# Patient Record
Sex: MALE | Race: WHITE
[De-identification: names, ages, dates, MRNs, and addresses within clinical notes are randomized per-mention and may not be internally consistent; named-entity substitution may affect disease eponyms.]

---

## 2017-04-16 ENCOUNTER — HOSPITAL ENCOUNTER (EMERGENCY)
Dept: HOSPITAL 75 - ER | Age: 27
Discharge: HOME | End: 2017-04-16
Payer: MEDICAID

## 2017-04-16 VITALS — DIASTOLIC BLOOD PRESSURE: 92 MMHG | SYSTOLIC BLOOD PRESSURE: 124 MMHG

## 2017-04-16 VITALS — WEIGHT: 180 LBS | BODY MASS INDEX: 26.66 KG/M2 | HEIGHT: 69 IN

## 2017-04-16 DIAGNOSIS — F31.9: ICD-10-CM

## 2017-04-16 DIAGNOSIS — G40.909: ICD-10-CM

## 2017-04-16 DIAGNOSIS — F17.210: ICD-10-CM

## 2017-04-16 DIAGNOSIS — F41.9: Primary | ICD-10-CM

## 2017-04-16 LAB
ALBUMIN SERPL-MCNC: 4.3 G/DL (ref 3.2–4.5)
ALT SERPL-CCNC: 93 U/L (ref 0–55)
ANION GAP SERPL CALC-SCNC: 10 MMOL/L (ref 5–14)
APAP SERPL-MCNC: < 10 UG/ML (ref 10–30)
AST SERPL-CCNC: 45 U/L (ref 5–34)
BASOPHILS # BLD AUTO: 0.1 10^3/UL (ref 0–0.1)
BASOPHILS NFR BLD AUTO: 1 % (ref 0–10)
BILIRUB SERPL-MCNC: 0.7 MG/DL (ref 0.1–1)
BILIRUB UR QL STRIP: NEGATIVE
BUN SERPL-MCNC: 14 MG/DL (ref 7–18)
BUN/CREAT SERPL: 19
CALCIUM SERPL-MCNC: 9.2 MG/DL (ref 8.5–10.1)
CHLORIDE SERPL-SCNC: 106 MMOL/L (ref 98–107)
CO2 SERPL-SCNC: 24 MMOL/L (ref 21–32)
CREAT SERPL-MCNC: 0.72 MG/DL (ref 0.6–1.3)
EOSINOPHIL # BLD AUTO: 0.2 10^3/UL (ref 0–0.3)
EOSINOPHIL NFR BLD AUTO: 3 % (ref 0–10)
ERYTHROCYTE [DISTWIDTH] IN BLOOD BY AUTOMATED COUNT: 12.8 % (ref 10–14.5)
ETHANOL SERPL-MCNC: < 10 MG/DL (ref ?–10)
GFR SERPLBLD BASED ON 1.73 SQ M-ARVRAT: > 60 ML/MIN
GLUCOSE SERPL-MCNC: 95 MG/DL (ref 70–105)
KETONES UR QL STRIP: NEGATIVE
LEUKOCYTE ESTERASE UR QL STRIP: (no result)
LYMPHOCYTES # BLD AUTO: 2.3 X 10^3 (ref 1–4)
LYMPHOCYTES NFR BLD AUTO: 32 % (ref 12–44)
MCH RBC QN AUTO: 31 PG (ref 25–34)
MCHC RBC AUTO-ENTMCNC: 34 G/DL (ref 32–36)
MCV RBC AUTO: 89 FL (ref 80–99)
MONOCYTES # BLD AUTO: 0.6 X 10^3 (ref 0–1)
MONOCYTES NFR BLD AUTO: 8 % (ref 0–12)
NEUTROPHILS # BLD AUTO: 4.1 X 10^3 (ref 1.8–7.8)
NEUTROPHILS NFR BLD AUTO: 57 % (ref 42–75)
NITRITE UR QL STRIP: NEGATIVE
PH UR STRIP: 7 [PH] (ref 5–9)
PLATELET # BLD: 284 10^3/UL (ref 130–400)
PMV BLD AUTO: 8.4 FL (ref 7.4–10.4)
POTASSIUM SERPL-SCNC: 3.9 MMOL/L (ref 3.6–5)
PROT SERPL-MCNC: 6.9 G/DL (ref 6.4–8.2)
PROT UR QL STRIP: NEGATIVE
RBC # BLD AUTO: 4.56 10^6/UL (ref 4.35–5.85)
SALICYLATES SERPL-MCNC: < 5 MG/DL (ref 5–20)
SODIUM SERPL-SCNC: 140 MMOL/L (ref 135–145)
SP GR UR STRIP: 1.01 (ref 1.02–1.02)
SQUAMOUS #/AREA URNS HPF: (no result) /HPF
UROBILINOGEN UR-MCNC: 1 MG/DL
WBC # BLD AUTO: 7.2 10^3/UL (ref 4.3–11)
WBC #/AREA URNS HPF: (no result) /HPF

## 2017-04-16 PROCEDURE — 85025 COMPLETE CBC W/AUTO DIFF WBC: CPT

## 2017-04-16 PROCEDURE — 80074 ACUTE HEPATITIS PANEL: CPT

## 2017-04-16 PROCEDURE — 80306 DRUG TEST PRSMV INSTRMNT: CPT

## 2017-04-16 PROCEDURE — 80329 ANALGESICS NON-OPIOID 1 OR 2: CPT

## 2017-04-16 PROCEDURE — 93005 ELECTROCARDIOGRAM TRACING: CPT

## 2017-04-16 PROCEDURE — 36415 COLL VENOUS BLD VENIPUNCTURE: CPT

## 2017-04-16 PROCEDURE — 80320 DRUG SCREEN QUANTALCOHOLS: CPT

## 2017-04-16 PROCEDURE — 81000 URINALYSIS NONAUTO W/SCOPE: CPT

## 2017-04-16 PROCEDURE — 84443 ASSAY THYROID STIM HORMONE: CPT

## 2017-04-16 PROCEDURE — 80053 COMPREHEN METABOLIC PANEL: CPT

## 2017-04-16 NOTE — ED PSYCHOSOCIAL
General


Chief Complaint:  Psych/Social Disorder


Stated Complaint:  AMS


Source:  patient (SOMEWHAT DIFFICULT HISTORIAN)





History of Present Illness


Time seen by provider:  12:07


Initial Comments


PT STATES "I'M BIPOLAR AND IT'S GETTING THE BEST OF ME"


PT STATES IS MOSTLY ANXIETY


PT STATES HE HAS NOT TAKEN ANY MEDICATIONS OR SEEN A DR IN 4-5 YEARS, AND 

SYMPTOMS HAVE BEEN WORSE FOR THE PAST 3-4 YEARS


HAS NOT SOUGHT CARE AT ANY TIME SINCE HE STOPPED TAKING HIS MEDICATIONS


STATES HE USED TO SEE DR. PATEL FOR MEDICAL PROBLEMS, AND SAW DR. IN 

[place] FOR MENTAL HEALTH





Allergies and Home Medications


Allergies


Coded Allergies:  


     methadone (Unverified  Allergy, Mild, 12/20/09)


     nortriptyline (Unverified  Allergy, Mild, 12/20/09)


     divalproex sodium (Verified  Allergy, Unknown, 10/11/15)





Home Medications


Cyclobenzaprine HCl 10 Mg Tablet, 10 MG PO Q8H PRN for SPASMS, #10 Ref 0


   Prescribed by: JACINTO HINES on 10/11/15 2215


Hydroxyzine Pamoate 50 Mg Capsule, 50 MG PO Q6H, #20


   Prescribed by: QUINTIN LOREDO on 4/16/17 1400


Tramadol HCl 50 Mg Tablet, 50 MG PO Q4H PRN for PAIN, #14 Ref 0


   Prescribed by: JACINTO HINES on 10/11/15 2213





Constitutional:  no symptoms reported


Respiratory:  no symptoms reported


Cardiovascular:  no symptoms reported


Gastrointestinal:  no symptoms reported


Genitourinary:  no symptoms reported


Musculoskeletal:  no symptoms reported


Skin:  no symptoms reported


Psychiatric/Neurological:  See HPI, Anxiety





Past Medical-Social-Family Hx


Patient Social History


Alcohol Use:  Regular Use (HISTORY OF VERY HEAVY USE/ABUSE--NOW DRINKS "A FEW 

BEERS A COUPLE OF TIMES A MONTH" STATES LAST ETOH WAS 1 WEEK AGO, PER PT ON 04/ 16/17)


Recreational Drug Use:  Yes (THC NOW, "EVERYTHING" IN THE PAST DENIES IV USE)


Smoking Status:  Current Everyday Smoker (1 1/2 PPD)


Recent Foreign Travel:  No


Contact w/Someone Who Travel:  No





Immunizations Up To Date


Tetanus Booster (TDap):  Unknown





Surgeries


HX Surgeries:  Yes (EYE SURGERY X 2--ONCE AS CHILD, ONCE AS ADULT)


Surgeries:  Eye Surgery





Respiratory


Hx Respiratory Disorders:  No





Cardiovascular


Hx Cardiac Disorders:  No





Neurological


Hx Neurological Disorders:  Yes (seizures (none for 10 yrs))


Neurological Disorders:  Seizure Disorder





Reproductive System


Hx Reproductive Disorders:  No


Sexually Transmitted Disease:  No





Genitourinary


Hx Genitourinary Disorders:  No





Gastrointestinal


Hx Gastrointestinal Disorders:  No





Musculoskeletal


Hx Musculoskeletal Disorders:  No





Endocrine


Hx Endocrine Disorders:  No





HEENT


HX ENT Disorders:  No





Cancer


Hx Cancer:  No





Psychosocial


Hx Psychiatric Problems:  Yes


Behavioral Health Disorders:  ADD/ADHD, Anxiety, Bipolar, Depression





Integumentary


HX Skin/Integumentary Disorder:  No





Blood Transfusions


Hx Blood Disorders:  No


Adverse Reaction to a Blood Tr:  No





Family Medical History


Significant Family History:  No Pertinent Family Hx





Physical Exam


Vital Signs





Vital Sign - Last 12Hours








 4/16/17





 12:15


 


Temp 96.0


 


Pulse 80


 


Resp 18


 


B/P (MAP) 127/96


 


Pulse Ox 99


 


O2 Delivery Room Air





Capillary Refill :


General Appearance:  WD/WN, no apparent distress, other (DIRTY, UNKMEPT)


HEENT:  PERRL/EOMI


Neck:  normal inspection


Respiratory:  normal breath sounds, no respiratory distress, no accessory 

muscle use


Cardiovascular:  regular rate, rhythm, no murmur


Gastrointestinal:  normal bowel sounds, non tender, soft


Extremities:  normal inspection


Neurologic/Psychiatric:  CNs II-XII nml as tested, no motor/sensory deficits, 

alert, oriented x 3, other (SOMEWHAT ANXIOUS)


Appearance/Memory:  no memory impairment, disheveled


Behavior/Eye Contact:  cooperative, normal speech, other (NO SUICIDAL OR 

HOMICIDAL IDEATIONS)


Thoughts/Hallucinations:  no apparent hallucination


Skin:  normal color, warm/dry, tattoos/piercings (MULTIPLE TATTOOS)





Progress/Results/Core Measures


Results/Orders


Lab Results





Laboratory Tests








Test


  4/16/17


12:30 4/16/17


12:40 Range/Units


 


 


Urine Color YELLOW    


 


Urine Clarity CLEAR    


 


Urine pH 7   5-9  


 


Urine Specific Gravity 1.010 L  1.016-1.022  


 


Urine Protein NEGATIVE   NEGATIVE  


 


Urine Glucose (UA) NEGATIVE   NEGATIVE  


 


Urine Ketones NEGATIVE   NEGATIVE  


 


Urine Nitrite NEGATIVE   NEGATIVE  


 


Urine Bilirubin NEGATIVE   NEGATIVE  


 


Urine Urobilinogen 1   NORMAL  MG/DL


 


Urine Leukocyte Esterase 1+ H  NEGATIVE  


 


Urine RBC (Auto) 1+ H  NEGATIVE  


 


Urine RBC RARE    /HPF


 


Urine WBC 2-5    /HPF


 


Urine Squamous Epithelial


Cells RARE 


  


   /HPF


 


 


Urine Crystals NONE    /LPF


 


Urine Bacteria TRACE    /HPF


 


Urine Casts NONE    /LPF


 


Urine Mucus SMALL H   /LPF


 


Urine Culture Indicated NO    


 


Urine Opiates Screen NEGATIVE   NEGATIVE  


 


Urine Oxycodone Screen NEGATIVE   NEGATIVE  


 


Urine Methadone Screen NEGATIVE   NEGATIVE  


 


Urine Propoxyphene Screen NEGATIVE   NEGATIVE  


 


Urine Barbiturates Screen NEGATIVE   NEGATIVE  


 


Ur Tricyclic Antidepressants


Screen NEGATIVE 


  


  NEGATIVE  


 


 


Urine Phencyclidine Screen NEGATIVE   NEGATIVE  


 


Urine Amphetamines Screen NEGATIVE   NEGATIVE  


 


Urine Methamphetamines Screen NEGATIVE   NEGATIVE  


 


Urine Benzodiazepines Screen NEGATIVE   NEGATIVE  


 


Urine Cocaine Screen NEGATIVE   NEGATIVE  


 


Urine Cannabinoids Screen POSITIVE H  NEGATIVE  


 


White Blood Count


  


  7.2 


  4.3-11.0


10^3/uL


 


Red Blood Count


  


  4.56 


  4.35-5.85


10^6/uL


 


Hemoglobin  13.9  13.3-17.7  G/DL


 


Hematocrit  40  40-54  %


 


Mean Corpuscular Volume  89  80-99  FL


 


Mean Corpuscular Hemoglobin  31  25-34  PG


 


Mean Corpuscular Hemoglobin


Concent 


  34 


  32-36  G/DL


 


 


Red Cell Distribution Width  12.8  10.0-14.5  %


 


Platelet Count


  


  284 


  130-400


10^3/uL


 


Mean Platelet Volume  8.4  7.4-10.4  FL


 


Neutrophils (%) (Auto)  57  42-75  %


 


Lymphocytes (%) (Auto)  32  12-44  %


 


Monocytes (%) (Auto)  8  0-12  %


 


Eosinophils (%) (Auto)  3  0-10  %


 


Basophils (%) (Auto)  1  0-10  %


 


Neutrophils # (Auto)  4.1  1.8-7.8  X 10^3


 


Lymphocytes # (Auto)  2.3  1.0-4.0  X 10^3


 


Monocytes # (Auto)  0.6  0.0-1.0  X 10^3


 


Eosinophils # (Auto)


  


  0.2 


  0.0-0.3


10^3/uL


 


Basophils # (Auto)


  


  0.1 


  0.0-0.1


10^3/uL


 


Sodium Level  140  135-145  MMOL/L


 


Potassium Level  3.9  3.6-5.0  MMOL/L


 


Chloride Level  106    MMOL/L


 


Carbon Dioxide Level  24  21-32  MMOL/L


 


Anion Gap  10  5-14  MMOL/L


 


Blood Urea Nitrogen  14  7-18  MG/DL


 


Creatinine


  


  0.72 


  0.60-1.30


MG/DL


 


Estimat Glomerular Filtration


Rate 


  > 60 


   


 


 


BUN/Creatinine Ratio  19   


 


Glucose Level  95    MG/DL


 


Calcium Level  9.2  8.5-10.1  MG/DL


 


Total Bilirubin  0.7  0.1-1.0  MG/DL


 


Aspartate Amino Transf


(AST/SGOT) 


  45 H


  5-34  U/L


 


 


Alanine Aminotransferase


(ALT/SGPT) 


  93 H


  0-55  U/L


 


 


Alkaline Phosphatase  67    U/L


 


Total Protein  6.9  6.4-8.2  G/DL


 


Albumin  4.3  3.2-4.5  G/DL


 


TSH [place] Testing


  


  1.05 


  0.35-4.94


UIU/ML


 


Salicylates Level  < 5.0 L 5.0-20.0  MG/DL


 


Acetaminophen Level  < 10 L 10-30  UG/ML


 


Serum Alcohol  < 10  <10  MG/DL








My Orders





Orders - QUINTIN LOREDO DO


Ua Culture If Indicated (4/16/17 12:10)


Thyroid Analyzer (4/16/17 12:10)


Drug Screen Stat (Urine) (4/16/17 12:10)


Cbc With Automated Diff (4/16/17 12:10)


Comprehensive Metabolic Panel (4/16/17 12:10)


Alcohol (4/16/17 12:10)


Acetaminophen (4/16/17 12:10)


Salicylate (4/16/17 12:10)


Ekg Tracing (4/16/17 12:10)


Hepatitis Panel Acute (4/16/17 13:48)





Vital Signs/I&O





Vital Sign - Last 12Hours








 4/16/17 4/16/17





 12:15 14:05


 


Temp 96.0 


 


Pulse 80 78


 


Resp 18 18


 


B/P (MAP) 127/96 


 


Pulse Ox 99 99


 


O2 Delivery Room Air 











Departure


Impression


Impression:  


 Primary Impression:  


 Anxiety


 Additional Impression:  


 Hx of bipolar disorder


Disposition:  01 HOME, SELF-CARE


Condition:  Stable





Departure-Patient Inst.


Referrals:  


NO,LOCAL PHYSICIAN (PCP)


Primary Care Physician








[facility]


Patient Instructions:  Anxiety, Adult (DC), Bipolar Disorder (DC)





Add. Discharge Instructions:  


CALL [facility] TOMORROW TO ESTABLISH CARE WITH BOTH MEDICAL AND MENTAL HEALTH





All discharge instructions reviewed with patient and/or family. Voiced 

understanding.


Scripts


Hydroxyzine Pamoate (Vistaril) 50 Mg Capsule


50 MG PO Q6H for Anxiety, #20 CAP


   Prov: QUINTIN LOREDO DO         4/16/17











QUINTIN LOREDO DO Apr 16, 2017 12:17

## 2017-04-17 LAB — HCV INDEX: >11 (ref 0–0.79)

## 2019-04-26 ENCOUNTER — HOSPITAL ENCOUNTER (EMERGENCY)
Dept: HOSPITAL 75 - ER | Age: 29
Discharge: HOME | End: 2019-04-26
Payer: COMMERCIAL

## 2019-04-26 VITALS — BODY MASS INDEX: 25.18 KG/M2 | WEIGHT: 170 LBS | HEIGHT: 69 IN

## 2019-04-26 VITALS — DIASTOLIC BLOOD PRESSURE: 79 MMHG | SYSTOLIC BLOOD PRESSURE: 129 MMHG

## 2019-04-26 DIAGNOSIS — R40.2252: ICD-10-CM

## 2019-04-26 DIAGNOSIS — F31.9: ICD-10-CM

## 2019-04-26 DIAGNOSIS — S42.294A: Primary | ICD-10-CM
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
## 2019-04-26 DIAGNOSIS — R40.2362: ICD-10-CM

## 2019-04-26 DIAGNOSIS — M46.96: ICD-10-CM

## 2019-04-26 DIAGNOSIS — G40.909: ICD-10-CM

## 2019-04-26 DIAGNOSIS — Z88.5: ICD-10-CM

## 2019-04-26 DIAGNOSIS — R40.2142: ICD-10-CM

## 2019-04-26 DIAGNOSIS — Z88.8: ICD-10-CM

## 2019-04-26 LAB
ALBUMIN SERPL-MCNC: 4.4 GM/DL (ref 3.2–4.5)
ALP SERPL-CCNC: 72 U/L (ref 40–136)
ALT SERPL-CCNC: 56 U/L (ref 0–55)
APTT PPP: YELLOW S
BACTERIA #/AREA URNS HPF: NEGATIVE /HPF
BARBITURATES UR QL: NEGATIVE
BASOPHILS # BLD AUTO: 0 10^3/UL (ref 0–0.1)
BASOPHILS NFR BLD AUTO: 0 % (ref 0–10)
BENZODIAZ UR QL SCN: NEGATIVE
BILIRUB SERPL-MCNC: 0.4 MG/DL (ref 0.1–1)
BILIRUB UR QL STRIP: NEGATIVE
BUN/CREAT SERPL: 22
CALCIUM SERPL-MCNC: 9.6 MG/DL (ref 8.5–10.1)
CHLORIDE SERPL-SCNC: 107 MMOL/L (ref 98–107)
CO2 SERPL-SCNC: 18 MMOL/L (ref 21–32)
COCAINE UR QL: NEGATIVE
CREAT SERPL-MCNC: 0.85 MG/DL (ref 0.6–1.3)
EOSINOPHIL # BLD AUTO: 0.2 10^3/UL (ref 0–0.3)
EOSINOPHIL NFR BLD AUTO: 2 % (ref 0–10)
ERYTHROCYTE [DISTWIDTH] IN BLOOD BY AUTOMATED COUNT: 12.5 % (ref 10–14.5)
FIBRINOGEN PPP-MCNC: CLEAR MG/DL
GFR SERPLBLD BASED ON 1.73 SQ M-ARVRAT: > 60 ML/MIN
GLUCOSE SERPL-MCNC: 121 MG/DL (ref 70–105)
GLUCOSE UR STRIP-MCNC: NEGATIVE MG/DL
HCT VFR BLD CALC: 40 % (ref 40–54)
HGB BLD-MCNC: 13.6 G/DL (ref 13.3–17.7)
KETONES UR QL STRIP: NEGATIVE
LEUKOCYTE ESTERASE UR QL STRIP: (no result)
LYMPHOCYTES # BLD AUTO: 4.9 X 10^3 (ref 1–4)
LYMPHOCYTES NFR BLD AUTO: 43 % (ref 12–44)
MANUAL DIFFERENTIAL PERFORMED BLD QL: NO
MCH RBC QN AUTO: 30 PG (ref 25–34)
MCHC RBC AUTO-ENTMCNC: 34 G/DL (ref 32–36)
MCV RBC AUTO: 87 FL (ref 80–99)
METHADONE UR QL SCN: NEGATIVE
METHAMPHETAMINE SCREEN URINE S: NEGATIVE
MONOCYTES # BLD AUTO: 0.7 X 10^3 (ref 0–1)
MONOCYTES NFR BLD AUTO: 6 % (ref 0–12)
NEUTROPHILS # BLD AUTO: 5.5 X 10^3 (ref 1.8–7.8)
NEUTROPHILS NFR BLD AUTO: 49 % (ref 42–75)
NITRITE UR QL STRIP: NEGATIVE
OPIATES UR QL SCN: NEGATIVE
OXYCODONE UR QL: NEGATIVE
PH UR STRIP: 8 [PH] (ref 5–9)
PLATELET # BLD: 430 10^3/UL (ref 130–400)
PMV BLD AUTO: 8.6 FL (ref 7.4–10.4)
POTASSIUM SERPL-SCNC: 3.5 MMOL/L (ref 3.6–5)
PROPOXYPH UR QL: NEGATIVE
PROT SERPL-MCNC: 7.2 GM/DL (ref 6.4–8.2)
PROT UR QL STRIP: NEGATIVE
RBC #/AREA URNS HPF: (no result) /HPF
SODIUM SERPL-SCNC: 139 MMOL/L (ref 135–145)
SP GR UR STRIP: 1.01 (ref 1.02–1.02)
SQUAMOUS #/AREA URNS HPF: (no result) /HPF
TRICYCLICS UR QL SCN: NEGATIVE
UROBILINOGEN UR-MCNC: NORMAL MG/DL
WBC # BLD AUTO: 11.4 10^3/UL (ref 4.3–11)
WBC #/AREA URNS HPF: (no result) /HPF

## 2019-04-26 PROCEDURE — 71045 X-RAY EXAM CHEST 1 VIEW: CPT

## 2019-04-26 PROCEDURE — 73030 X-RAY EXAM OF SHOULDER: CPT

## 2019-04-26 PROCEDURE — 80320 DRUG SCREEN QUANTALCOHOLS: CPT

## 2019-04-26 PROCEDURE — 85025 COMPLETE CBC W/AUTO DIFF WBC: CPT

## 2019-04-26 PROCEDURE — 80053 COMPREHEN METABOLIC PANEL: CPT

## 2019-04-26 PROCEDURE — 81000 URINALYSIS NONAUTO W/SCOPE: CPT

## 2019-04-26 PROCEDURE — 36415 COLL VENOUS BLD VENIPUNCTURE: CPT

## 2019-04-26 PROCEDURE — 72131 CT LUMBAR SPINE W/O DYE: CPT

## 2019-04-26 PROCEDURE — 80306 DRUG TEST PRSMV INSTRMNT: CPT

## 2019-04-26 RX ADMIN — FENTANYL CITRATE ONE MCG: 50 INJECTION, SOLUTION INTRAMUSCULAR; INTRAVENOUS at 23:26

## 2019-04-26 RX ADMIN — FENTANYL CITRATE ONE MCG: 50 INJECTION, SOLUTION INTRAMUSCULAR; INTRAVENOUS at 23:25
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
## 2021-04-23 ENCOUNTER — HOSPITAL ENCOUNTER (EMERGENCY)
Dept: HOSPITAL 75 - ER | Age: 31
Discharge: HOME | End: 2021-04-23
Payer: MEDICAID

## 2021-04-23 VITALS — WEIGHT: 164.91 LBS | HEIGHT: 69.02 IN | BODY MASS INDEX: 24.42 KG/M2

## 2021-04-23 VITALS — DIASTOLIC BLOOD PRESSURE: 80 MMHG | SYSTOLIC BLOOD PRESSURE: 114 MMHG

## 2021-04-23 DIAGNOSIS — Z88.8: ICD-10-CM
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
## 2021-04-23 DIAGNOSIS — S41.151A: Primary | ICD-10-CM

## 2021-04-23 DIAGNOSIS — Z23: ICD-10-CM

## 2021-04-23 DIAGNOSIS — Z88.5: ICD-10-CM

## 2021-04-23 PROCEDURE — 90715 TDAP VACCINE 7 YRS/> IM: CPT

## 2021-04-23 PROCEDURE — 99284 EMERGENCY DEPT VISIT MOD MDM: CPT

## 2021-04-23 NOTE — ED UPPER EXTREMITY
General


Stated Complaint:  R ARM INJ;DOG BITE


Source:  patient


Exam Limitations:  no limitations





History of Present Illness


Date Seen by Provider:  Apr 23, 2021


Time Seen by Provider:  16:25


Initial Comments


To ER with a dog bite to the ulnar side of the right forearm.  This occurred 

just prior to arrival.  He just moved to [facility] and was 

building a pad for his 2 dogs when they got into a fight.  He tried to separate 

them and was bitten.  They are both up-to-date on their rabies vaccine.  He is 

not up-to-date on his tetanus vaccine.  Informs me that he has a history of 

myocardial infarction at the age of 20 as he was born with an "racing heart" and

fetal alcohol syndrome.  Also informs me that he has bipolar disorder and 

"psychotic".  However, informs me I should not be worried about that.


Onset:  just prior to arrival


Severity:  moderate


Pain/Injury Location:  right forearm


Modifying Factors:  Improves With Movement





Allergies and Home Medications


Allergies


Coded Allergies:  


     methadone (Unverified  Allergy, Mild, 12/20/09)


     nortriptyline (Unverified  Allergy, Mild, 12/20/09)


     divalproex sodium (Verified  Allergy, Unknown, 10/11/15)





Home Medications


Amoxicillin/Potassium Clav 1 Each Tablet, 1 EACH PO BID


   Prescribed by: MINOR HARDING on 4/23/21 1629


Cyclobenzaprine HCl 10 Mg Tablet, 10 MG PO Q8H PRN for SPASMS


   Prescribed by: JACINTO HINES on 10/11/15 2215


Hydroxyzine Pamoate 50 Mg Capsule, 50 MG PO Q6H


   Prescribed by: QUINTIN LOREDO on 4/16/17 1400


Oxycodone HCl/Acetaminophen 1 Each Tablet, 1 TAB PO Q4H


   Prescribed by: IMNOR HARDING on 4/26/19 2250


Tramadol HCl 50 Mg Tablet, 50 MG PO Q4H PRN for PAIN


   Prescribed by: JACINTO HINES on 10/11/15 2213





Patient Home Medication List


Home Medication List Reviewed:  Yes





Review of Systems


Constitutional:  see HPI


EENTM:  see HPI


Respiratory:  no symptoms reported


Cardiovascular:  no symptoms reported


Genitourinary:  no symptoms reported


Musculoskeletal:  no symptoms reported


Skin:  see HPI


Psychiatric/Neurological:  No Symptoms Reported





Past Medical-Social-Family Hx


Patient Social History


Drug of Choice:  THC


Type Used:  Cigarettes


Recent Hopitalizations:  No





Immunizations Up To Date


Tetanus Booster (TDap):  More than 5yrs





Past Medical History


Surgeries:  Yes (EYE SURGERY X 2--ONCE AS CHILD, ONCE AS ADULT)


Eye Surgery


Respiratory:  No


Cardiac:  No


Neurological:  Yes (seizures (none for 10 yrs))


Seizure Disorder


Reproductive Disorders:  No


Sexually Transmitted Disease:  No


Gastrointestinal:  No


Musculoskeletal:  No


Endocrine:  No


Cancer:  No


Psychosocial:  Yes


Bipolar


Integumentary:  No


Blood Disorders:  No


Adverse Reaction/Blood Tranf:  No





Family Medical History


No Pertinent Family Hx





Physical Exam


Vital Signs





Vital Signs - First Documented








 4/23/21





 16:18


 


Temp 35.2


 


Pulse 91


 


Resp 18


 


B/P (MAP) 114/80 (91)


 


Pulse Ox 100


 


O2 Delivery Room Air





Capillary Refill :


Height, Weight, BMI


Height: 5'9.00"


Weight: 170lbs. oz. 77.715168vd; 21.09 BMI


Method:Stated


General Appearance:  WD/WN, no apparent distress, other (Hyperactive, talks 

loudly at a high rate of speech but is overall cooperative so far.)


Cardiovascular:  no murmur, tachycardia


Respiratory:  no respiratory distress, no accessory muscle use


Gastrointestinal:  normal bowel sounds, soft


Elbow/Forearm:  Right (There is a 1 x 1 cm laceration to the ulnar side of the 

forearm on the right.  No foreign bodies identified.  This was anesthetized with

2 mL of 1% lidocaine without epinephrine.  Irrigated with 250 mL of normal 

saline with chlorhexidine.  Closed loosely with 1 horizontal mattress suture 

size 4-0 Prolene.)


Wrist:  Yes normal inspection, Yes non-tender


Hand:  normal inspection, non-tender





Progress/Results/Core Measures


Results/Orders


My Orders





Orders - MINOR HARDING


Dipht,Pertuss(Acell),Tet Adult (Boostrix (4/23/21 16:30)


Amoxicillin/Clavulanate Tablet (Augmenti (4/23/21 16:30)





Medications Given in ED





Current Medications








 Medications  Dose


 Ordered  Sig/Mauro


 Route  Start Time


 Stop Time Status Last Admin


Dose Admin


 


 Diphtheria/


 Tetanus/Acell


 Pertussis  0.5 ml  ONCE ONCE


 IM  4/23/21 16:30


 4/23/21 16:31 DC 4/23/21 16:30


0.5 ML








Vital Signs/I&O











 4/23/21





 16:18


 


Temp 35.2


 


Pulse 91


 


Resp 18


 


B/P (MAP) 114/80 (91)


 


Pulse Ox 100


 


O2 Delivery Room Air











Departure


Impression





   Primary Impression:  


   Dog bite of right arm


Disposition:  01 HOME, SELF-CARE


Condition:  Stable





Departure-Patient Inst.


Decision time for Depature:  16:27


Referrals:  


NO,LOCAL PHYSICIAN (PCP/Family)


Primary Care Physician


Patient Instructions:  Animal Bites ED





Add. Discharge Instructions:  


1.  Antibiotics as directed.  Change the dressing daily.  Return to ER in about 

10 days to have the stitches removed.  Return before then for any redness 

swelling or other concerns.  You can shower letting water run over this.


Scripts


Amoxicillin/Potassium Clav (Augmentin 875-125 Tablet) 1 Each Tablet


1 EACH PO BID, #14 TAB 0 Refills


   Prov: MINOR HARDING         4/23/21











MINOR HARDING             Apr 23, 2021 16:30

## 2021-05-05 ENCOUNTER — HOSPITAL ENCOUNTER (EMERGENCY)
Dept: HOSPITAL 75 - ER | Age: 31
Discharge: HOME | End: 2021-05-05
Payer: MEDICAID

## 2021-05-05 VITALS — BODY MASS INDEX: 22.96 KG/M2 | WEIGHT: 154.98 LBS | HEIGHT: 68.9 IN

## 2021-05-05 VITALS — DIASTOLIC BLOOD PRESSURE: 86 MMHG | SYSTOLIC BLOOD PRESSURE: 127 MMHG

## 2021-05-05 DIAGNOSIS — Z48.02: Primary | ICD-10-CM

## 2021-05-05 PROCEDURE — 99281 EMR DPT VST MAYX REQ PHY/QHP: CPT

## 2023-02-18 ENCOUNTER — HOSPITAL ENCOUNTER (EMERGENCY)
Dept: HOSPITAL 75 - ER | Age: 33
LOS: 1 days | Discharge: HOME | End: 2023-02-19
Payer: MEDICAID

## 2023-02-18 VITALS — DIASTOLIC BLOOD PRESSURE: 96 MMHG | SYSTOLIC BLOOD PRESSURE: 139 MMHG

## 2023-02-18 VITALS — HEIGHT: 69.02 IN | BODY MASS INDEX: 24.42 KG/M2 | WEIGHT: 164.91 LBS
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
## 2023-02-18 DIAGNOSIS — S30.0XXA: Primary | ICD-10-CM

## 2023-02-18 DIAGNOSIS — Z28.310: ICD-10-CM
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
## 2023-02-18 DIAGNOSIS — F17.200: ICD-10-CM

## 2023-02-18 DIAGNOSIS — Z87.828: ICD-10-CM

## 2023-02-18 PROCEDURE — 99282 EMERGENCY DEPT VISIT SF MDM: CPT

## 2023-02-18 PROCEDURE — 72131 CT LUMBAR SPINE W/O DYE: CPT
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
## 2023-02-19 NOTE — DIAGNOSTIC IMAGING REPORT
PROCEDURE: CT lumbar spine without contrast.



TECHNIQUE: Multiple contiguous axial images were obtained through

the lumbar spine without the use of intravenous contrast.

Sagittal and coronal reformations were then performed. Auto

Exposure Controls were utilized during the CT exam to meet ALARA

standards for radiation dose reduction. 



INDICATION:  Fall, back pain. Compared with study 04/26/2019.



FINDINGS:



An old L3 single column superior endplate compression deformity

has healed. This results in may be 20% stature loss at its

anterior 3rd. There is no retropulsion and no involvement of the

bony 3rd column. No acute or subacute fracture. The remaining

stature is normal. The alignment is anatomic. No paraspinal

hemorrhage. Disc bulge and endplate osteophytes with posterior

element hypertrophy at L4-L5 and L5-S1 result in mild canal and

bi-foraminal stenoses. No acute appearing abnormality.



IMPRESSION:

1. No acute fracture or other acute abnormality. Stable known L3

remote single column superior endplate compression. 

2. Lower lumbar degenerative changes and mild stenosis chronic.



Dictated by: 



  Dictated on workstation # [id number]